# Patient Record
Sex: MALE | Race: ASIAN | NOT HISPANIC OR LATINO | URBAN - METROPOLITAN AREA
[De-identification: names, ages, dates, MRNs, and addresses within clinical notes are randomized per-mention and may not be internally consistent; named-entity substitution may affect disease eponyms.]

---

## 2017-06-17 ENCOUNTER — EMERGENCY (EMERGENCY)
Facility: HOSPITAL | Age: 32
LOS: 1 days | Discharge: HOME | End: 2017-06-17
Attending: EMERGENCY MEDICINE | Admitting: EMERGENCY MEDICINE
Payer: COMMERCIAL

## 2017-06-17 VITALS
OXYGEN SATURATION: 97 % | HEART RATE: 95 BPM | TEMPERATURE: 99 F | DIASTOLIC BLOOD PRESSURE: 72 MMHG | RESPIRATION RATE: 17 BRPM | SYSTOLIC BLOOD PRESSURE: 124 MMHG

## 2017-06-17 VITALS
DIASTOLIC BLOOD PRESSURE: 70 MMHG | SYSTOLIC BLOOD PRESSURE: 116 MMHG | OXYGEN SATURATION: 97 % | RESPIRATION RATE: 16 BRPM | HEART RATE: 87 BPM

## 2017-06-17 PROCEDURE — 99285 EMERGENCY DEPT VISIT HI MDM: CPT

## 2017-06-17 RX ORDER — OXYCODONE HYDROCHLORIDE 5 MG/1
5 TABLET ORAL ONCE
Qty: 0 | Refills: 0 | Status: DISCONTINUED | OUTPATIENT
Start: 2017-06-17 | End: 2017-06-17

## 2017-06-17 RX ORDER — LIDOCAINE 4 G/100G
1 CREAM TOPICAL ONCE
Qty: 0 | Refills: 0 | Status: COMPLETED | OUTPATIENT
Start: 2017-06-17 | End: 2017-06-17

## 2017-06-17 RX ORDER — IBUPROFEN 200 MG
1 TABLET ORAL
Qty: 20 | Refills: 0 | OUTPATIENT
Start: 2017-06-17

## 2017-06-17 RX ORDER — OXYCODONE HYDROCHLORIDE 5 MG/1
1 TABLET ORAL
Qty: 15 | Refills: 0 | OUTPATIENT
Start: 2017-06-17

## 2017-06-17 RX ORDER — KETOROLAC TROMETHAMINE 30 MG/ML
30 SYRINGE (ML) INJECTION ONCE
Qty: 0 | Refills: 0 | Status: DISCONTINUED | OUTPATIENT
Start: 2017-06-17 | End: 2017-06-17

## 2017-06-17 RX ORDER — DIAZEPAM 5 MG
1 TABLET ORAL
Qty: 10 | Refills: 0 | OUTPATIENT
Start: 2017-06-17

## 2017-06-17 RX ADMIN — OXYCODONE HYDROCHLORIDE 5 MILLIGRAM(S): 5 TABLET ORAL at 20:45

## 2017-06-17 RX ADMIN — OXYCODONE HYDROCHLORIDE 5 MILLIGRAM(S): 5 TABLET ORAL at 20:46

## 2017-06-17 RX ADMIN — Medication 30 MILLIGRAM(S): at 17:44

## 2017-06-17 RX ADMIN — OXYCODONE HYDROCHLORIDE 5 MILLIGRAM(S): 5 TABLET ORAL at 20:50

## 2017-06-17 RX ADMIN — OXYCODONE HYDROCHLORIDE 5 MILLIGRAM(S): 5 TABLET ORAL at 18:23

## 2017-06-17 RX ADMIN — LIDOCAINE 1 PATCH: 4 CREAM TOPICAL at 17:44

## 2017-06-17 RX ADMIN — Medication 30 MILLIGRAM(S): at 18:30

## 2017-06-17 NOTE — ED ADULT NURSE REASSESSMENT NOTE - GENERAL PATIENT STATE
ambulatory , sitting in chair at bedside/improvement verbalized/comfortable appearance/family/SO at bedside

## 2017-06-17 NOTE — ED ADULT NURSE NOTE - OBJECTIVE STATEMENT
pt states he was putting on his pants and felt a sharp pain in his lower back today. pt states pain is 7/10 but increases to 10/10 with movement. states pain goes across lower back denies radiation of pain to legs. Instructions given not to drive or drink alcohol after taking valium,  verbalized understanding. Family here to drive patient home.

## 2017-06-17 NOTE — ED ADULT NURSE REASSESSMENT NOTE - GENERAL PATIENT STATE
improvement verbalized/family/SO at bedside/comfortable appearance/sitting on side of strecher/resting/sleeping

## 2017-06-17 NOTE — ED ADULT NURSE NOTE - CHIEF COMPLAINT QUOTE
Pt. brought in by EMS from home c/o lower back pain. Hx of lower back pain related to injury at work a few years ago. Reports trying to get dressed this morning, bending over to pull up his pants and feeling sharp pain in mid-lower back. States he has been unable to stand due to pain. Denies incontinence, loss of sensation in extremities or weakness. Motor and sensation intact in bilateral LE.

## 2017-06-17 NOTE — ED PROVIDER NOTE - PLAN OF CARE
take motrin 600mg every 8 hours; valium 5mg every 8 hours; Oxycodone 5 mg every 6-8 hours as needed for breakthrough pain; avoid any strenuous activity; follow up with orthopedic spine specialist within one week; return to ED for any worsening pain.

## 2017-06-17 NOTE — ED PROVIDER NOTE - CARE PLAN
Principal Discharge DX:	Back pain at L4-L5 level  Instructions for follow-up, activity and diet:	take motrin 600mg every 8 hours; valium 5mg every 8 hours; Oxycodone 5 mg every 6-8 hours as needed for breakthrough pain; avoid any strenuous activity; follow up with orthopedic spine specialist within one week; return to ED for any worsening pain.

## 2017-06-17 NOTE — ED PROVIDER NOTE - OBJECTIVE STATEMENT
32M prior history of back strain p/w Right sided lower back pain with sudden onset at 8AM after bending to  clothing. Pt reports he twisted his spine and suddenly felt sharp pain in the right lower back that impaired his ability to move. Pt took a cyclobenzaprine with minimal relief, slept on the floor, and came to the ED for worsening pain. Pt is unable to ambulate 2/2 severe pain. No n/v, pt able to urinate. Denies numbness, weakness, genital numbness. Denies head and neck trauma. Pt in usual state of health prior to this injury

## 2017-06-17 NOTE — ED ADULT TRIAGE NOTE - CHIEF COMPLAINT QUOTE
Pt. brought in by EMS from home c/o lower back pain. Hx of lower back pain related to injury at work a few year ago. Reports trying to get dressed this morning, bending over to pull up his pants and feeling sharp pain in mid-lower back. States he has been unable to stand due to pain. Denies incontinence, loss of sensation in extremities or weakness. Motor and sensation intact in bilateral LE. Pt. brought in by EMS from home c/o lower back pain. Hx of lower back pain related to injury at work a few years ago. Reports trying to get dressed this morning, bending over to pull up his pants and feeling sharp pain in mid-lower back. States he has been unable to stand due to pain. Denies incontinence, loss of sensation in extremities or weakness. Motor and sensation intact in bilateral LE.

## 2017-06-17 NOTE — ED PROVIDER NOTE - ATTENDING CONTRIBUTION TO CARE
Attending Statement: I have personally seen and examined this patient. I have fully participated in the care of this patient. I have reviewed all pertinent clinical information, including history physical exam, plan and the Resident's note and agree except as noted   33yo M no sig pmhx BIBEMS co back pain since this morning, pt went to put his pants on and developed lower back pain. unable to get up, laid on the floor. no numbness/weakness. no fever or chills. no nausea/vomit. no cp or sob. no abdominal pain. Took cyclobenzaprine pta w some relief. no dysuria/freq or urgency.   Vital signs noted. pt laying in bed eating a cracker, no distress. supple neck. nontender midline cervical/thoracic or lumbar spine. no step off. neg straight leg. no cvat . normal S1-S2 No resp distress. able to speak in full and clear sentences. no wheeze, rales or stridor. soft nt abdomen.   no pedal edema. no calf tenderness. normal pulses bilateral feet.   nl sensation bl lower ext. dec leg raise due to pain. no radiation to the lower ext.  plan pain med, re assess. Dw w pt and wife importance to follow up with pmd for possible PT

## 2017-06-17 NOTE — ED ADULT NURSE REASSESSMENT NOTE - GENERAL PATIENT STATE
but states he still has difficulty walking, even using the cane/comfortable appearance/improvement verbalized/family/SO at bedside

## 2017-06-17 NOTE — ED PROVIDER NOTE - PROGRESS NOTE DETAILS
pt sitting up, able to dangle Legs side of bed. no numbness/weakness. pain improved. Explained pt no indication for an emergent MRI now. pt able to stand and bear wt w a cane LACI Thompson: Received sign out from Dr. Naqvi. Pt feeling much better, pain now 2/10, is able to walk with cane and sit in chair. Will dc home with pain meds and ortho spine follow up.

## 2018-10-02 NOTE — ED ADULT TRIAGE NOTE - SPO2 (%)
Physical Exam    Vital Signs: I have reviewed the initial vital signs.  Constitutional: well-nourished, appears stated age, no acute distress  Cardiovascular: S1 and S2, regular rate, regular rhythm, well-perfused extremities, radial pulses equal and 2+  Respiratory: unlabored respiratory effort, clear to auscultation bilaterally no wheezing, rales and rhonchi  Gastrointestinal: soft, non-tender abdomen  Musculoskeletal: supple neck, no lower extremity edema, no midline tenderness  Integumentary: warm, dry, no rash  Psychiatric: patient is not able to hold a conversation but is verbal; demented but not aggressive or combative
97

## 2020-07-26 ENCOUNTER — EMERGENCY (EMERGENCY)
Facility: HOSPITAL | Age: 35
LOS: 1 days | Discharge: HOME | End: 2020-07-26
Attending: EMERGENCY MEDICINE | Admitting: EMERGENCY MEDICINE
Payer: COMMERCIAL

## 2020-07-26 VITALS
TEMPERATURE: 99 F | RESPIRATION RATE: 16 BRPM | OXYGEN SATURATION: 100 % | SYSTOLIC BLOOD PRESSURE: 137 MMHG | DIASTOLIC BLOOD PRESSURE: 77 MMHG | HEART RATE: 99 BPM

## 2020-07-26 LAB
ANION GAP SERPL CALC-SCNC: 15 MMO/L — HIGH (ref 7–14)
APPEARANCE UR: CLEAR — SIGNIFICANT CHANGE UP
BASOPHILS # BLD AUTO: 0.07 K/UL — SIGNIFICANT CHANGE UP (ref 0–0.2)
BASOPHILS NFR BLD AUTO: 0.5 % — SIGNIFICANT CHANGE UP (ref 0–2)
BILIRUB UR-MCNC: NEGATIVE — SIGNIFICANT CHANGE UP
BLOOD UR QL VISUAL: NEGATIVE — SIGNIFICANT CHANGE UP
BUN SERPL-MCNC: 11 MG/DL — SIGNIFICANT CHANGE UP (ref 7–23)
CALCIUM SERPL-MCNC: 9.8 MG/DL — SIGNIFICANT CHANGE UP (ref 8.4–10.5)
CHLORIDE SERPL-SCNC: 103 MMOL/L — SIGNIFICANT CHANGE UP (ref 98–107)
CO2 SERPL-SCNC: 23 MMOL/L — SIGNIFICANT CHANGE UP (ref 22–31)
COLOR SPEC: SIGNIFICANT CHANGE UP
CREAT SERPL-MCNC: 1.06 MG/DL — SIGNIFICANT CHANGE UP (ref 0.5–1.3)
EOSINOPHIL # BLD AUTO: 0.02 K/UL — SIGNIFICANT CHANGE UP (ref 0–0.5)
EOSINOPHIL NFR BLD AUTO: 0.1 % — SIGNIFICANT CHANGE UP (ref 0–6)
GLUCOSE SERPL-MCNC: 126 MG/DL — HIGH (ref 70–99)
GLUCOSE UR-MCNC: NEGATIVE — SIGNIFICANT CHANGE UP
HCT VFR BLD CALC: 51 % — HIGH (ref 39–50)
HGB BLD-MCNC: 17 G/DL — SIGNIFICANT CHANGE UP (ref 13–17)
IMM GRANULOCYTES NFR BLD AUTO: 0.4 % — SIGNIFICANT CHANGE UP (ref 0–1.5)
KETONES UR-MCNC: NEGATIVE — SIGNIFICANT CHANGE UP
LEUKOCYTE ESTERASE UR-ACNC: NEGATIVE — SIGNIFICANT CHANGE UP
LYMPHOCYTES # BLD AUTO: 17.6 % — SIGNIFICANT CHANGE UP (ref 13–44)
LYMPHOCYTES # BLD AUTO: 2.46 K/UL — SIGNIFICANT CHANGE UP (ref 1–3.3)
MCHC RBC-ENTMCNC: 30.5 PG — SIGNIFICANT CHANGE UP (ref 27–34)
MCHC RBC-ENTMCNC: 33.3 % — SIGNIFICANT CHANGE UP (ref 32–36)
MCV RBC AUTO: 91.4 FL — SIGNIFICANT CHANGE UP (ref 80–100)
MONOCYTES # BLD AUTO: 0.2 K/UL — SIGNIFICANT CHANGE UP (ref 0–0.9)
MONOCYTES NFR BLD AUTO: 1.4 % — LOW (ref 2–14)
NEUTROPHILS # BLD AUTO: 11.18 K/UL — HIGH (ref 1.8–7.4)
NEUTROPHILS NFR BLD AUTO: 80 % — HIGH (ref 43–77)
NITRITE UR-MCNC: NEGATIVE — SIGNIFICANT CHANGE UP
NRBC # FLD: 0 K/UL — SIGNIFICANT CHANGE UP (ref 0–0)
PH UR: 7 — SIGNIFICANT CHANGE UP (ref 5–8)
PLATELET # BLD AUTO: 167 K/UL — SIGNIFICANT CHANGE UP (ref 150–400)
PMV BLD: 10.2 FL — SIGNIFICANT CHANGE UP (ref 7–13)
POTASSIUM SERPL-MCNC: 4.3 MMOL/L — SIGNIFICANT CHANGE UP (ref 3.5–5.3)
POTASSIUM SERPL-SCNC: 4.3 MMOL/L — SIGNIFICANT CHANGE UP (ref 3.5–5.3)
PROT UR-MCNC: NEGATIVE — SIGNIFICANT CHANGE UP
RBC # BLD: 5.58 M/UL — SIGNIFICANT CHANGE UP (ref 4.2–5.8)
RBC # FLD: 12.2 % — SIGNIFICANT CHANGE UP (ref 10.3–14.5)
SODIUM SERPL-SCNC: 141 MMOL/L — SIGNIFICANT CHANGE UP (ref 135–145)
SP GR SPEC: 1.01 — SIGNIFICANT CHANGE UP (ref 1–1.04)
UROBILINOGEN FLD QL: NORMAL — SIGNIFICANT CHANGE UP
WBC # BLD: 13.99 K/UL — HIGH (ref 3.8–10.5)
WBC # FLD AUTO: 13.99 K/UL — HIGH (ref 3.8–10.5)

## 2020-07-26 PROCEDURE — 99220: CPT

## 2020-07-26 RX ORDER — HYDROMORPHONE HYDROCHLORIDE 2 MG/ML
1 INJECTION INTRAMUSCULAR; INTRAVENOUS; SUBCUTANEOUS ONCE
Refills: 0 | Status: DISCONTINUED | OUTPATIENT
Start: 2020-07-26 | End: 2020-07-26

## 2020-07-26 RX ORDER — MORPHINE SULFATE 50 MG/1
4 CAPSULE, EXTENDED RELEASE ORAL ONCE
Refills: 0 | Status: DISCONTINUED | OUTPATIENT
Start: 2020-07-26 | End: 2020-07-26

## 2020-07-26 RX ORDER — DIAZEPAM 5 MG
5 TABLET ORAL ONCE
Refills: 0 | Status: DISCONTINUED | OUTPATIENT
Start: 2020-07-26 | End: 2020-07-26

## 2020-07-26 RX ADMIN — Medication 5 MILLIGRAM(S): at 19:52

## 2020-07-26 RX ADMIN — MORPHINE SULFATE 4 MILLIGRAM(S): 50 CAPSULE, EXTENDED RELEASE ORAL at 19:52

## 2020-07-26 RX ADMIN — Medication 60 MILLIGRAM(S): at 19:53

## 2020-07-26 RX ADMIN — MORPHINE SULFATE 4 MILLIGRAM(S): 50 CAPSULE, EXTENDED RELEASE ORAL at 20:22

## 2020-07-26 RX ADMIN — HYDROMORPHONE HYDROCHLORIDE 1 MILLIGRAM(S): 2 INJECTION INTRAMUSCULAR; INTRAVENOUS; SUBCUTANEOUS at 21:26

## 2020-07-26 NOTE — ED CDU PROVIDER INITIAL DAY NOTE - MUSCULOSKELETAL MINIMAL EXAM
atraumatic/normal range of motion/no midline spinal tenderness, no skin changes. +right paraspinal lumbar muscle spasm

## 2020-07-26 NOTE — ED PROVIDER NOTE - PROGRESS NOTE DETAILS
pt with continued back pain.  Multiple doses of narcotics without relief.  Will admit to CDU for pain control and MRI

## 2020-07-26 NOTE — ED CDU PROVIDER INITIAL DAY NOTE - OBJECTIVE STATEMENT
36 y/o male with no pmhx presents to ED c/o right lower back pain today. States he bent down to grab something from his fridge and felt acute onset of pain in his right lower back described as a "spasm." Nonradiating. Difficulty ambulating due to pain. States he took oxycodone and flexeril he had at home from 2 years ago when he had a similar episode of pain. No fever, chills, drug use, cp, sob, abd pain, n/v/d, dysuria, weakness, numbness, tingling, incontinence, saddle anesthesia.

## 2020-07-26 NOTE — ED CDU PROVIDER INITIAL DAY NOTE - MEDICAL DECISION MAKING DETAILS
34 y/o male with no pmhx presents to ED c/o right lower back pain today. States he bent down to grab something from his fridge and felt acute onset of pain in his right lower back described as a "spasm." Difficulty ambulating due to pain. no neuro deficits on exam. sent to cdu for pain control and MRI lumbar spine, PT eval.

## 2020-07-26 NOTE — ED PROVIDER NOTE - OBJECTIVE STATEMENT
35 yr old M with hx of low back pain here for exacerbation of back pain. Pt states this morning around 9:00am started to experience pain localized to right lower back. Pt states pain is exacerbated by lifting right leg. No bowel or bladder incontinence. Pt notes pain is severe despite taking oxycodone and cyclobenzaprine. Pt notes this is typical of his back exacerbations.

## 2020-07-26 NOTE — ED CDU PROVIDER INITIAL DAY NOTE - NEUROLOGICAL, MLM
Alert and oriented, no focal deficits, no motor or sensory deficits. 5/5 strength of b/l hip-flexion, dorsiflexion and plantarflexion. no saddle anesthesia

## 2020-07-26 NOTE — ED CDU PROVIDER INITIAL DAY NOTE - ATTENDING CONTRIBUTION TO CARE
pt with history of sciatica and back problems presenting with an exacerbation of his typical pain.  Took home oxycodone and did not improve.  No red flags for cord compression at this time.  In ED received multiple doses of narcotics and benzo along with steroids without improvement or ability to ambulate.  Being brought to CDU for pain control and possible need for further imaging.    Gen: Well appearing in NAD  Head: NC/AT  Neck: trachea midline  Resp:  No distress  Ext: no deformities no midline ttp +SL raise on R side  Neuro:  A&O appears non focal  Skin:  Warm and dry as visualized  Psych:  Normal affect and mood

## 2020-07-26 NOTE — ED PROVIDER NOTE - CLINICAL SUMMARY MEDICAL DECISION MAKING FREE TEXT BOX
35 yr old M with acute low back pain. No red flags for cord compression. No indication for imaging at this time. Will treat symptomatically and reassess.

## 2020-07-26 NOTE — ED ADULT TRIAGE NOTE - CHIEF COMPLAINT QUOTE
Pt c/o low back pain that started this morning, states the only medical problems he has is chronic low back pain. Pt states he took 15mg of flexeril, 10mg of oxycodone,  15mg of meloxicam today without relief. Denies trauma or injury to his back.

## 2020-07-26 NOTE — ED ADULT NURSE NOTE - OBJECTIVE STATEMENT
35 year old male with PMH of chronic lower back pain states he was laying in bed with his knees bent and turned his head the wrong way and felt a sharp pain in his lower back pt denies numbness or tingling to his legs   Pt medicated as ordered plan of care discussed will continue to monitor

## 2020-07-26 NOTE — ED PROVIDER NOTE - PHYSICAL EXAMINATION
Gen: Well appearing in NAD  Head: NC/AT  Neck: trachea midline  Resp:  No distress  Ext: no deformities  Neuro:  A&O appears non focal  Skin:  Warm and dry as visualized  Psych:  Normal affect and mood   Msk: no ttp midline. + right straight leg raise.

## 2020-07-27 VITALS
TEMPERATURE: 98 F | HEART RATE: 91 BPM | SYSTOLIC BLOOD PRESSURE: 137 MMHG | OXYGEN SATURATION: 100 % | RESPIRATION RATE: 18 BRPM | DIASTOLIC BLOOD PRESSURE: 74 MMHG

## 2020-07-27 LAB
HBA1C BLD-MCNC: 5.5 % — SIGNIFICANT CHANGE UP (ref 4–5.6)
MAGNESIUM SERPL-MCNC: 2.3 MG/DL — SIGNIFICANT CHANGE UP (ref 1.6–2.6)
SARS-COV-2 RNA SPEC QL NAA+PROBE: SIGNIFICANT CHANGE UP

## 2020-07-27 PROCEDURE — 72148 MRI LUMBAR SPINE W/O DYE: CPT | Mod: 26

## 2020-07-27 PROCEDURE — 99217: CPT

## 2020-07-27 RX ORDER — CYCLOBENZAPRINE HYDROCHLORIDE 10 MG/1
1 TABLET, FILM COATED ORAL
Qty: 12 | Refills: 0
Start: 2020-07-27 | End: 2020-07-30

## 2020-07-27 RX ORDER — HYDROMORPHONE HYDROCHLORIDE 2 MG/ML
1 INJECTION INTRAMUSCULAR; INTRAVENOUS; SUBCUTANEOUS ONCE
Refills: 0 | Status: DISCONTINUED | OUTPATIENT
Start: 2020-07-27 | End: 2020-07-27

## 2020-07-27 RX ORDER — LIDOCAINE 4 G/100G
1 CREAM TOPICAL ONCE
Refills: 0 | Status: COMPLETED | OUTPATIENT
Start: 2020-07-27 | End: 2020-07-27

## 2020-07-27 RX ORDER — CYCLOBENZAPRINE HYDROCHLORIDE 10 MG/1
10 TABLET, FILM COATED ORAL ONCE
Refills: 0 | Status: COMPLETED | OUTPATIENT
Start: 2020-07-27 | End: 2020-07-27

## 2020-07-27 RX ORDER — OXYCODONE HYDROCHLORIDE 5 MG/1
1 TABLET ORAL
Qty: 9 | Refills: 0
Start: 2020-07-27 | End: 2020-07-29

## 2020-07-27 RX ORDER — IBUPROFEN 200 MG
1 TABLET ORAL
Qty: 20 | Refills: 0
Start: 2020-07-27 | End: 2020-07-31

## 2020-07-27 RX ORDER — OXYCODONE HYDROCHLORIDE 5 MG/1
5 TABLET ORAL EVERY 6 HOURS
Refills: 0 | Status: DISCONTINUED | OUTPATIENT
Start: 2020-07-27 | End: 2020-07-27

## 2020-07-27 RX ORDER — KETOROLAC TROMETHAMINE 30 MG/ML
30 SYRINGE (ML) INJECTION EVERY 8 HOURS
Refills: 0 | Status: DISCONTINUED | OUTPATIENT
Start: 2020-07-27 | End: 2020-07-27

## 2020-07-27 RX ORDER — DIAZEPAM 5 MG
5 TABLET ORAL EVERY 8 HOURS
Refills: 0 | Status: DISCONTINUED | OUTPATIENT
Start: 2020-07-27 | End: 2020-07-27

## 2020-07-27 RX ADMIN — OXYCODONE HYDROCHLORIDE 5 MILLIGRAM(S): 5 TABLET ORAL at 08:30

## 2020-07-27 RX ADMIN — OXYCODONE HYDROCHLORIDE 5 MILLIGRAM(S): 5 TABLET ORAL at 07:15

## 2020-07-27 RX ADMIN — HYDROMORPHONE HYDROCHLORIDE 1 MILLIGRAM(S): 2 INJECTION INTRAMUSCULAR; INTRAVENOUS; SUBCUTANEOUS at 09:58

## 2020-07-27 RX ADMIN — Medication 30 MILLIGRAM(S): at 09:23

## 2020-07-27 RX ADMIN — CYCLOBENZAPRINE HYDROCHLORIDE 10 MILLIGRAM(S): 10 TABLET, FILM COATED ORAL at 10:53

## 2020-07-27 RX ADMIN — LIDOCAINE 1 PATCH: 4 CREAM TOPICAL at 01:40

## 2020-07-27 NOTE — ED CDU PROVIDER DISPOSITION NOTE - NSFOLLOWUPINSTRUCTIONS_ED_ALL_ED_FT
Follow with your primary care provider within 48-72 hours.  Follow up with an orthopedic doctor or PM&R (pain management and rehabilitation), referral list provided  Rest, no heavy lifting.  Warm compresses to area.  Light walking.   Take Motrin 600 mg every 8 hours for pain with food  Take Cyclobenzaprine 10mg every 8 hours as needed for muscle spasm- caution drowsiness/do not drive.   Take Oxycodone 5mg (1 tablet) every 6 hours for SEVERE pain, caution drowsiness do not drive or drink alcohol while taking  Take Prednisone 40mg (2 tablets) daily for 4 days  Any worsening pain, weakness, numbness, bowel or urinary incontinence return to ER

## 2020-07-27 NOTE — ED CDU PROVIDER DISPOSITION NOTE - PATIENT PORTAL LINK FT
You can access the FollowMyHealth Patient Portal offered by [facility] by registering at the following website: [email or web address]. By joining [facility]’s FollowMyHealth portal, you will also be able to view your health information using other applications (apps) compatible with our system.

## 2020-07-27 NOTE — PHYSICAL THERAPY INITIAL EVALUATION ADULT - PERTINENT HX OF CURRENT PROBLEM, REHAB EVAL
Pt is a 35 year old male presenting with right lower back pain. MRI negative for herniated disc. No significant past medical history.

## 2020-07-27 NOTE — PHYSICAL THERAPY INITIAL EVALUATION ADULT - MODALITIES TREATMENT COMMENTS
Initial encounter at 9:15 AM - pt with increased pain and c/o muscle spasms. Pt presenting with decreased functional mobility. Followed up with pt ~ 11:45 AM, symptoms resolved. Pt with only slight pain. Pt ambulatory with supervision.

## 2020-07-27 NOTE — ED CDU PROVIDER SUBSEQUENT DAY NOTE - PHYSICAL EXAMINATION
pt appears in discomfort from low back pain  no midline spinal tenderness, sensation intact and equal in b/l LE  pt reports he is unable to bend legs further than 45 degrees without spasm of the low back

## 2020-07-27 NOTE — ED CDU PROVIDER SUBSEQUENT DAY NOTE - HISTORY
LACI Rincon: Pt signed out to me by LACI Shi.    35yM w/no stated pmhx p/w right sided lower back pain x yesterday. Pt states he recently was bending over the car to change the oil and yesterday bent to get something out of the fridge and felt pain in the low back. Pt unable to ambulate due to pain. Pt was sent to the CDU for MRI, pending results. This morning pt reports continued "cramping" in the low back.

## 2020-07-27 NOTE — ED ADULT NURSE REASSESSMENT NOTE - NS ED NURSE REASSESS COMMENT FT1
Pt resting in results waiting, Alert and oriented x3. c.o 2/10 lower back pain, denies need for medication at this time. Pt waiting for COVID swab

## 2020-07-27 NOTE — ED CDU PROVIDER SUBSEQUENT DAY NOTE - PROGRESS NOTE DETAILS
Pt resting comfortably. pending MRI results. Pt is now able to ambulate with cane in the ED. MRI showing degenerative changes. Will d/c home with flexeril, ibuprofen, oxycodone and 4 days of prednisone. Pt agrees with this plan, provided PM and R and ortho follow up

## 2020-07-27 NOTE — ED CDU PROVIDER SUBSEQUENT DAY NOTE - MEDICAL DECISION MAKING DETAILS
35yM w/no stated pmhx p/w right sided lower back pain x yesterday in setting of working on his car and reaching into the fridge with immediate onset of pain. Pt appears uncomfortable, NV intact. Pending MRI results and PT eval

## 2020-07-27 NOTE — ED CDU PROVIDER DISPOSITION NOTE - CLINICAL COURSE
Jeffrey:  35M denies pmh presented to ED with right lower back pain that was triggered by bending to reach for something out of refrigerator shortly after bending over car to change oil.  Whenever patient attempts to stand up and ambulate, experiences severe right lower back spasms.  MRI with no acute surgical findings.  Symptoms improved during CDU stay, ambulatory with cane prn, assessed by physical therapy.  Stable for outpatient follow up.

## 2020-07-27 NOTE — ED CDU PROVIDER DISPOSITION NOTE - ATTENDING CONTRIBUTION TO CARE
Dr. Murphy:  I performed a face to face bedside interview with patient regarding history of present illness, review of symptoms and past medical history. I completed an independent physical exam.  I have discussed patient's plan of care with PA.   I agree with note as stated above, having amended the EMR as needed to reflect my findings.   This includes HISTORY OF PRESENT ILLNESS, HIV, PAST MEDICAL/SURGICAL/FAMILY/SOCIAL HISTORY, ALLERGIES AND HOME MEDICATIONS, REVIEW OF SYSTEMS, PHYSICAL EXAM, and any PROGRESS NOTES during the time I functioned as the attending physician for this patient.

## 2020-07-27 NOTE — PHYSICAL THERAPY INITIAL EVALUATION ADULT - PATIENT PROFILE REVIEW, REHAB EVAL
yes yes/PT orders received: Ambulate as tolerated. Consult with AMADEO Vega, patient may participate in PT evaluation.

## 2020-07-27 NOTE — ED CDU PROVIDER SUBSEQUENT DAY NOTE - ATTENDING CONTRIBUTION TO CARE
Dr. Murphy:  I performed a face to face bedside interview with patient regarding history of present illness, review of symptoms and past medical history. I completed an independent physical exam.  I have discussed patient's plan of care with PA.   I agree with note as stated above, having amended the EMR as needed to reflect my findings.   This includes HISTORY OF PRESENT ILLNESS, HIV, PAST MEDICAL/SURGICAL/FAMILY/SOCIAL HISTORY, ALLERGIES AND HOME MEDICATIONS, REVIEW OF SYSTEMS, PHYSICAL EXAM, and any PROGRESS NOTES during the time I functioned as the attending physician for this patient.    35M denies pmh presented to ED with right lower back pain that was triggered by bending to reach for something out of refrigerator shortly after bending over car to change oil.  States pain is improved at rest this morning, but still having severe difficulty with ambulation.  Whenever patient attempts to stand up and ambulate, experiences severe right lower back spasms.       Exam:  - nad  - rrr  - ctab   -abd soft ntnd  - no midline spinal TTP  - able to ambulate a few steps with assistance but experiencing severe right lower back spasms    A/P  - right lower back pain/spasms  - MRI with no acute surgical findings  - pain control, muscle relaxants, reassess  - appreciate PT recs

## 2023-08-07 NOTE — ED PROVIDER NOTE - MEDICAL DECISION MAKING DETAILS
Assistance with ambulation/Assistance OOB with selected safe patient handling equipment/Communicate Risk of Fall with Harm to all staff/Discuss with provider need for PT consult/Monitor gait and stability/Reinforce activity limits and safety measures with patient and family/Review medications for side effects contributing to fall risk/Sit up slowly, dangle for a short time, stand at bedside before walking/Tailored Fall Risk Interventions/Use of alarms - bed, chair and/or voice tab/Visual Cue: Yellow wristband and red socks/Bed in lowest position, wheels locked, appropriate side rails in place/Call bell, personal items and telephone in reach/Instruct patient to call for assistance before getting out of bed or chair/Non-slip footwear when patient is out of bed/[place] to call system/Physically safe environment - no spills, clutter or unnecessary equipment/Purposeful Proactive Rounding/Room/bathroom lighting operational, light cord in reach
32M no sig PMH p/w right sided back pain 2/2 abnormal movement. Pain control. Ambulate. likely d/c